# Patient Record
Sex: MALE | Race: ASIAN | NOT HISPANIC OR LATINO | Employment: UNEMPLOYED | URBAN - METROPOLITAN AREA
[De-identification: names, ages, dates, MRNs, and addresses within clinical notes are randomized per-mention and may not be internally consistent; named-entity substitution may affect disease eponyms.]

---

## 2018-02-22 ENCOUNTER — OFFICE VISIT (OUTPATIENT)
Dept: FAMILY MEDICINE | Facility: CLINIC | Age: 43
End: 2018-02-22
Payer: COMMERCIAL

## 2018-02-22 VITALS
TEMPERATURE: 98 F | HEIGHT: 66 IN | DIASTOLIC BLOOD PRESSURE: 70 MMHG | OXYGEN SATURATION: 97 % | WEIGHT: 120 LBS | HEART RATE: 71 BPM | SYSTOLIC BLOOD PRESSURE: 100 MMHG | BODY MASS INDEX: 19.29 KG/M2

## 2018-02-22 DIAGNOSIS — Z22.7 TB LUNG, LATENT: ICD-10-CM

## 2018-02-22 DIAGNOSIS — A15.9 TB (TUBERCULOSIS): ICD-10-CM

## 2018-02-22 DIAGNOSIS — Z00.00 ANNUAL PHYSICAL EXAM: Primary | ICD-10-CM

## 2018-02-22 PROCEDURE — 99999 PR PBB SHADOW E&M-NEW PATIENT-LVL III: CPT | Mod: PBBFAC,,, | Performed by: FAMILY MEDICINE

## 2018-02-22 PROCEDURE — 99386 PREV VISIT NEW AGE 40-64: CPT | Mod: S$GLB,,, | Performed by: FAMILY MEDICINE

## 2018-02-22 RX ORDER — LANOLIN ALCOHOL/MO/W.PET/CERES
50 CREAM (GRAM) TOPICAL DAILY
COMMUNITY

## 2018-02-22 RX ORDER — RIFAMPIN 300 MG/1
10 CAPSULE ORAL DAILY
COMMUNITY

## 2018-02-22 RX ORDER — ISONIAZID 300 MG/1
300 TABLET ORAL DAILY
COMMUNITY

## 2018-02-22 RX ORDER — PYRAZINAMIDE TABLET 500 MG/1
30 TABLET ORAL DAILY
COMMUNITY

## 2018-02-22 RX ORDER — ETHAMBUTOL HYDROCHLORIDE 400 MG/1
15 TABLET, FILM COATED ORAL DAILY
COMMUNITY

## 2018-02-22 NOTE — PROGRESS NOTES
Office Visit    Patient Name: Paras Lai    : 1975  MRN: [id number]      Assessment/Plan:  Paras Lai is a 42 y.o. male who presents today for :    Annual physical exam    -anticipatory guidance provided  -call clinic back with any questions or concerns  -Request for his records to obtain lab results that was done recently.    TB lung, latent  -     CT Chest Without Contrast; Future; Expected date: 2018  -     Ambulatory referral to Pulmonology    No active Sx. Currently undergoing treatment by providers at [facility], who requested for patient to obtain CT chest at our facility. His providers there also requested referral to Pulmonology, which I've made on their behalf.  -pt has f/u appointment at [facility] in 3 weeks.    Follow-up for any evaluation as needed.     This note was created by combination of typed  and Dragon dictation.  Transcription errors may be present.  If there are any questions, please contact me.        ----------------------------------------------------------------------------------------------------------------------      HPI:  Paras is a 42 y.o. male who presents today for:    Establish Care        This patient has multiple medical diagnoses as noted below.      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      Patient is here today for Establish Care      Patient is here today for routine annual PCP preventative history and physical examination.  Patient is doing well and has no major complaints.    He recently had a TB skin test two months ago that was done at [facility] on [facility].  Was found to have 13mm induration on PPD with negative CXR.  His clinic physician referred him to our facility to get a CT scan and referral to Pulmonology.   He's currently being treated with Rifampin/Ethambutol/INH/Pyrazinamide/Pyridoxine at [facility] - which he has a  f/u in 3 weeks.    During this time, he has NOT had any coughs/fever/chills/night sweats/weight loss.    Drug use? no  Tobacco use? no  Alcohol use? No      He also had recent bloodwork done at outside facility, which showed NEG MMRV/HIV.  We will request for his records to obtain more lab results.      Additional ROS  No F/C/wt changes/fatigue  No dysphagia/sore throat/rhinorrhea  No CP/SOB/palpitations/swelling  No cough/wheezing/SOB  No nausea/vomiting/abd pain/no diarrhea, no constipation, blood in stool  No muscle aches/joint pain   No rashes  No weakness/HA/tingling/numbness  No anxiety/depression  No dysuria/hematuria  No polyuria/polydipsia/fatigue/cold or hot intolerance      There is no problem list on file for this patient.      Current Medications  Medications reviewed and updated.       Current Outpatient Prescriptions:     ethambutol (MYAMBUTOL) 400 MG Tab, Take 15 mg/kg by mouth once daily., Disp: , Rfl:     isoniazid (NYDRAZID) 300 MG Tab, Take 300 mg by mouth once daily., Disp: , Rfl:     pyrazinamide 500 mg Tab, Take 30 mg/kg by mouth once daily., Disp: , Rfl:     pyridoxine, vitamin B6, (B-6) 50 MG Tab, Take 50 mg by mouth once daily., Disp: , Rfl:     rifAMpin (RIFADIN) 300 MG capsule, Take 10 mg/kg by mouth once daily., Disp: , Rfl:     History reviewed. No pertinent surgical history.    History reviewed. No pertinent family history.    Social History     Social History    Marital status: Single     Spouse name: N/A    Number of children: N/A    Years of education: N/A     Occupational History    Not on file.     Social History Main Topics    Smoking status: Never Smoker    Smokeless tobacco: Never Used    Alcohol use No    Drug use: No    Sexual activity: No     Other Topics Concern    Not on file     Social History Narrative    No narrative on file           Allergies   Review of patient's allergies indicates:  No Known Allergies          Review of Systems  See  "HPI      Physical Exam  /70   Pulse 71   Temp 98.3 °F (36.8 °C) (Oral)   Ht 5' 6" (1.676 m)   Wt 54.4 kg (120 lb)   SpO2 97%   BMI 19.37 kg/m²     GEN: NAD, well developed, pleasant, well nourished  HEENT: NCAT, PERRLA, EOMI, sclera clear, anicteric, bilateral ear exam wnl, O/P clear, MMM with no lesions  NECK: normal, supple with midline trachea, no LAD, no thyromegaly  LUNGS: CTAB, no w/r/r, no increased work of breathing   HEART: RRR, normal S1 and S2, no m/r/g, no edema  ABD: s/nt/nd, NABS  SKIN: normal turgor, no rashes  PSYCH: AOx3, appropriate mood and affect  MSK: warm/well perfused, normal ROM in all 4 extremities, no c/c/e.      Labs  No results found for: LABA1C, HGBA1C  No results found for: NA, K, CL, CO2, BUN, CREATININE, CALCIUM, ANIONGAP, ESTGFRAFRICA, EGFRNONAA  No results found for: CHOL  No results found for: HDL  No results found for: LDLCALC  No results found for: TRIG  No results found for: CHOLHDL  Last set of blood work has been reviewed as noted above.      Health Maintenance  Health Maintenance    Patient has no pending health maintenance at this time                     "

## 2018-03-13 ENCOUNTER — HOSPITAL ENCOUNTER (OUTPATIENT)
Dept: RADIOLOGY | Facility: HOSPITAL | Age: 43
Discharge: HOME | End: 2018-03-13
Attending: FAMILY MEDICINE
Payer: COMMERCIAL

## 2018-03-13 DIAGNOSIS — A15.9 TB (TUBERCULOSIS): ICD-10-CM

## 2018-03-13 PROCEDURE — 71250 CT THORAX DX C-: CPT | Mod: TC

## 2018-03-13 PROCEDURE — 71250 CT THORAX DX C-: CPT | Mod: 26,,, | Performed by: RADIOLOGY

## 2018-04-13 ENCOUNTER — OFFICE VISIT (OUTPATIENT)
Dept: SLEEP MEDICINE | Facility: CLINIC | Age: 43
End: 2018-04-13
Payer: COMMERCIAL

## 2018-04-13 VITALS
DIASTOLIC BLOOD PRESSURE: 68 MMHG | WEIGHT: 120.38 LBS | HEIGHT: 65 IN | OXYGEN SATURATION: 98 % | HEART RATE: 69 BPM | SYSTOLIC BLOOD PRESSURE: 112 MMHG | BODY MASS INDEX: 20.06 KG/M2

## 2018-04-13 DIAGNOSIS — A15.0 PULMONARY TB: Primary | ICD-10-CM

## 2018-04-13 PROCEDURE — 99204 OFFICE O/P NEW MOD 45 MIN: CPT | Mod: S$GLB,,, | Performed by: INTERNAL MEDICINE

## 2018-04-13 PROCEDURE — 99999 PR PBB SHADOW E&M-EST. PATIENT-LVL III: CPT | Mod: PBBFAC,,, | Performed by: INTERNAL MEDICINE

## 2018-04-13 NOTE — PROGRESS NOTES
Paras Lai  was seen as a new patient at the request  Nahid Bettencourt MD for the evaluation of  Pulmonary tuberculosis.    CHIEF COMPLAINT:  PPD Reading      HISTORY OF PRESENT ILLNESS: Paras Lai is a 42 y.o. male  has a past medical history of Pulmonary tuberculosis. Patient emigrate from  to  in 2017.  Pdd test showed 13 mm in duration.  Patient was referred to [facility] with Dr. Velazquez.  Currently on RIPE therapy.  Patient denied coughing or wheezing.  No hemoptysis.  Father with pulmonary tuberculosis.  No weight loss.  Patient is a monk and active with yard work.  Patient run over 30 min daily.      PAST MEDICAL HISTORY:    Active Ambulatory Problems     Diagnosis Date Noted    No Active Ambulatory Problems     Resolved Ambulatory Problems     Diagnosis Date Noted    No Resolved Ambulatory Problems     Past Medical History:   Diagnosis Date    Pulmonary tuberculosis                 PAST SURGICAL HISTORY:    History reviewed. No pertinent surgical history.      FAMILY HISTORY:                History reviewed. No pertinent family history.    SOCIAL HISTORY:          Tobacco:   History   Smoking Status    Never Smoker   Smokeless Tobacco    Never Used     alcohol use:    History   Alcohol Use No               Occupation:  monk    ALLERGIES:  Review of patient's allergies indicates:  No Known Allergies    CURRENT MEDICATIONS:    Current Outpatient Prescriptions   Medication Sig Dispense Refill    ethambutol (MYAMBUTOL) 400 MG Tab Take 15 mg/kg by mouth once daily.      isoniazid (NYDRAZID) 300 MG Tab Take 300 mg by mouth once daily.      pyrazinamide 500 mg Tab Take 30 mg/kg by mouth once daily.      pyridoxine, vitamin B6, (B-6) 50 MG Tab Take 50 mg by mouth once daily.      rifAMpin (RIFADIN) 300 MG capsule Take 10 mg/kg by mouth once daily.       No current facility-administered medications for this visit.                   REVIEW OF SYSTEMS:     Pulmonary related symptoms as per HPI.  Gen:   "no weight loss, no fever, no night sweat  HEENT:  no visual changes, no sore throat, no hearing loss  CV:  No chest pain, no orthopnea, no PND  GI:  no melena, no hematochezia, no diarhea, no constipation.  :  no dysuria, no hematuria, no hesistancy, no dribbling  Neuro:  no syncope, no vertigo, no tinitus  Psych:  No homocide or suicide ideation; no depression.  Endocrine:  No heat or cold intolerance.  Sleep:  No snoring; no witnessed apnea.  Otherwise, a balance of systems reviewed is negative.          PHYSICAL EXAM:  Vitals:    04/13/18 1027   BP: 112/68   Pulse: 69   SpO2: 98%   Weight: 54.6 kg (120 lb 5.9 oz)   Height: 5' 5" (1.651 m)   PainSc: 0-No pain     Body mass index is 20.03 kg/m².     GENERAL:  well develop; no apparent distress  HEENT:  no nasal congestion; no discharge noted; class 3 modified mallampatti.   NECK:  supple; no palpable masses.  CARDIO: regular rate and rhythm  PULM:  clear to auscultation bilaterally; no intercostals retractions; no accessory muscle usage   ABDOMEN:  soft nontender/nondistended.  +bowel sound  EXTREMITIES no cce  NEURO:  CN II-XII intact.  5/5 motor in all extremities.  sensation grossly intact   to light touch.  PSYCH:  normal affect.  Alert and oriented x 4    LABS  Pulmonary Functions Testing Results: none  ABG none  CXR:  none  CT CHEST:  2/28/18 yesy bronchiectasis.  No effusion or consolidation  Ppd 10/7/17@ [facility] 13mm    ASSESSMENT    ICD-10-CM ICD-9-CM    1. Pulmonary TB A15.0 011.90        PLAN:  Pulmonary TB- ct c/w h/o pulmonary TB.  Advice patient to keep appointment with [facility].  Currently on months 4 of 9 of RIPE therapy.  Clinically doing well.        Patient will Follow-up if symptoms worsen or fail to improve. with md/np.    CC: Send copy of this note to Nahid Bettencourt MD   "

## 2018-04-13 NOTE — LETTER
April 13, 2018      Nahid Bettencourt MD  [address] [facility]  [place] [address]           [facility]  [address] [place] [address]  Phone: [phone]  Fax: [phone]          Patient: Paras Lai   MR Number: [id number]   YOB: 1975   Date of Visit: 4/13/2018       Dear Dr. Nahid Bettencourt:    Thank you for referring Paras Lai to me for evaluation. Attached you will find relevant portions of my assessment and plan of care.    If you have questions, please do not hesitate to call me. I look forward to following Paras Lai along with you.    Sincerely,    Nickolas Browning MD    Enclosure  CC:  No Recipients    If you would like to receive this communication electronically, please contact [email or web address] or [phone] to request more information on [employer] Link access.    For providers and/or their staff who would like to refer a patient to [facility], please contact us through our one-stop-shop provider referral line, [facility], at [phone].    If you feel you have received this communication in error or would no longer like to receive these types of communications, please e-mail [email or web address]

## 2018-04-24 DIAGNOSIS — Z72.0 TOBACCO USE: ICD-10-CM

## 2018-05-04 ENCOUNTER — HOSPITAL ENCOUNTER (OUTPATIENT)
Dept: RADIOLOGY | Facility: HOSPITAL | Age: 43
Discharge: HOME | End: 2018-05-04
Attending: INTERNAL MEDICINE
Payer: COMMERCIAL

## 2018-05-04 DIAGNOSIS — Z72.0 TOBACCO USE: ICD-10-CM

## 2018-05-04 PROCEDURE — 71250 CT THORAX DX C-: CPT | Mod: TC

## 2018-05-04 PROCEDURE — 71250 CT THORAX DX C-: CPT | Mod: 26,,, | Performed by: RADIOLOGY

## 2019-07-16 ENCOUNTER — TELEPHONE (OUTPATIENT)
Dept: FAMILY MEDICINE | Facility: CLINIC | Age: 44
End: 2019-07-16